# Patient Record
Sex: FEMALE
[De-identification: names, ages, dates, MRNs, and addresses within clinical notes are randomized per-mention and may not be internally consistent; named-entity substitution may affect disease eponyms.]

---

## 2024-03-21 PROBLEM — Z00.00 ENCOUNTER FOR PREVENTIVE HEALTH EXAMINATION: Status: ACTIVE | Noted: 2024-03-21

## 2024-03-22 ENCOUNTER — APPOINTMENT (OUTPATIENT)
Dept: RADIATION ONCOLOGY | Facility: CLINIC | Age: 54
End: 2024-03-22
Payer: COMMERCIAL

## 2024-03-22 VITALS
SYSTOLIC BLOOD PRESSURE: 144 MMHG | HEART RATE: 78 BPM | TEMPERATURE: 98.4 F | WEIGHT: 168.04 LBS | BODY MASS INDEX: 28 KG/M2 | HEIGHT: 65 IN | RESPIRATION RATE: 16 BRPM | DIASTOLIC BLOOD PRESSURE: 79 MMHG | OXYGEN SATURATION: 100 %

## 2024-03-22 DIAGNOSIS — N18.6 END STAGE RENAL DISEASE: ICD-10-CM

## 2024-03-22 DIAGNOSIS — C21.1 MALIGNANT NEOPLASM OF ANAL CANAL: ICD-10-CM

## 2024-03-22 PROCEDURE — 99213 OFFICE O/P EST LOW 20 MIN: CPT

## 2024-03-22 RX ORDER — FAMOTIDINE 20 MG/1
20 TABLET, FILM COATED ORAL
Refills: 0 | Status: ACTIVE | COMMUNITY

## 2024-03-22 RX ORDER — PREDNISONE 5 MG/1
5 TABLET ORAL
Refills: 0 | Status: ACTIVE | COMMUNITY

## 2024-03-22 RX ORDER — CHOLECALCIFEROL (VITAMIN D3) 25 MCG
TABLET ORAL
Refills: 0 | Status: ACTIVE | COMMUNITY

## 2024-03-22 RX ORDER — TACROLIMUS 5 MG/1
5 CAPSULE, GELATIN COATED ORAL
Refills: 0 | Status: ACTIVE | COMMUNITY

## 2024-03-22 RX ORDER — GINGER ROOT/GINGER ROOT EXT 262.5 MG
CAPSULE ORAL
Refills: 0 | Status: ACTIVE | COMMUNITY

## 2024-03-22 RX ORDER — PRAVASTATIN SODIUM 20 MG/1
20 TABLET ORAL
Refills: 0 | Status: ACTIVE | COMMUNITY

## 2024-03-22 NOTE — REVIEW OF SYSTEMS
[Negative] : Neurological [Constipation: Grade 0] : Constipation: Grade 0 [Anal Pain: Grade 0] : Anal Pain: Grade 0 [Diarrhea: Grade 0] : Diarrhea: Grade 0 [Dysphagia: Grade 0] : Dysphagia: Grade 0 [Dyspepsia: Grade 0] : Dyspepsia: Grade 0 [Esophagitis: Grade 0] : Esophagitis: Grade 0 [Fecal Incontinence: Grade 0] : Fecal Incontinence: Grade 0 [Gastroparesis: Grade 0] : Gastroparesis: Grade 0 [Nausea: Grade 0] : Nausea: Grade 0 [Proctitis: Grade 0] : Proctitis: Grade 0 [Rectal Pain: Grade 0] : Rectal Pain: Grade 0 [Vomiting: Grade 0] : Vomiting: Grade 0 [Hematuria: Grade 0] : Hematuria: Grade 0 [Urinary Incontinence: Grade 0] : Urinary Incontinence: Grade 0  [Urinary Retention: Grade 0] : Urinary Retention: Grade 0 [Urinary Tract Pain: Grade 0] : Urinary Tract Pain: Grade 0 [Urinary Urgency: Grade 0] : Urinary Urgency: Grade 0 [Urinary Frequency: Grade 0] : Urinary Frequency: Grade 0 [Genital Edema: Grade 0] : Genital Edema: Grade 0 [Vaginal Stricture: Grade 0] : Vaginal Stricture: Grade 0 [Vaginal Infection: Grade 0] : Vaginal Infection: Grade 0  [Dyspareunia: Grade 0] : Dyspareunia: Grade 0 [Cognitive Disturbance: Grade 0] : Cognitive Disturbance: Grade 0 [Concentration Impairment: Grade 0] : Concentration Impairment: Grade 0 [Headache: Grade 0] : Headache: Grade 0 [Dizziness: Grade 0] : Dizziness: Grade 0  [Anxiety: Grade 0] : Anxiety: Grade 0  [Depression: Grade 0] : Depression: Grade 0

## 2024-03-22 NOTE — HISTORY OF PRESENT ILLNESS
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 Received radiochemotherapy from June 24,2019 to August 2, 2019. complete response,. yearly PET CT have revealed complete response and no recurrence. Last PET CT done April 2013. Next scheduled April 2024. Experienced rectal bleeding 2 months ago, heavy, saw Dr Sin at [facility], diagnosed as hemorrhoids, elastic band stopped bleeding. Proctoscopy revealed radiation proctitis, but patient is not bleeding after treating hemorrhoids.  Using mesalamine suppositories only during bleeding episode, patient required only once since then.  Followed by Dr Alvarez. Still has port right upper chest. using vaginal dilator as instructed.   Ms. Nusrat Pereyra is a 52 yo F pmhx kidney transplant in 2005 for end stage renal disease, hysterectomy dx T1N0M0 anal squamous cell carcinoma.    6/2023 Routine colonoscopy revealed condyloma, biopsy consistent with low grade squamous intraepithelial lesions. Three lesions positive for p63, p40 and p16.  Other 2 lesions revealed high-grade squamous intraepithelial lesions.    PET CT  October 2019. March 2020, June 2020. April 2022 and April 2023 were negative for metabolic activity.  ------------------------------------------------------------------------------------------------------------------------------------------------------- 3/22/24-Follow up - Today patient present generally well with no complaints of fever, chest pain, sob, nausea, vomiting, rectal bleeding, changes in bowel or urinary symptoms. Reports no changes in appetite or sudden weight loss.  Continues to use vaginal dilator.

## 2024-03-22 NOTE — LETTER GREETING
[Follow-Up] : Your patient, [unfilled] was seen in my office today for follow-up [FreeTextEntry2] : Myla Alvarez M.D

## 2024-03-22 NOTE — PHYSICAL EXAM
[FreeTextEntry1] : Alert. oriented. No adenopathy. Lungs clear. Port right upper chest. Abdomen soft. RABIA: good sphincter tone. No palpable mass.

## 2024-09-05 ENCOUNTER — OFFICE VISIT (OUTPATIENT)
Dept: URBAN - METROPOLITAN AREA CLINIC 128 | Facility: CLINIC | Age: 54
End: 2024-09-05
Payer: COMMERCIAL

## 2024-09-05 VITALS
BODY MASS INDEX: 24.1 KG/M2 | DIASTOLIC BLOOD PRESSURE: 79 MMHG | HEIGHT: 63 IN | HEART RATE: 98 BPM | TEMPERATURE: 98.6 F | WEIGHT: 136 LBS | SYSTOLIC BLOOD PRESSURE: 114 MMHG

## 2024-09-05 DIAGNOSIS — R13.19 DYSPHAGIA: ICD-10-CM

## 2024-09-05 PROCEDURE — 99214 OFFICE O/P EST MOD 30 MIN: CPT | Performed by: INTERNAL MEDICINE

## 2024-09-05 NOTE — HPI-TODAY'S VISIT:
history of dysphagia since last year. ugi showed cerivical osteophytes with additional narrowing at distal esophagus and barium tablet hanging in distal esophagus. reports intermittent dysphagia/spasm with eating unrelated to size of food bolus. no bleeding. on ozempic.

## 2024-09-23 ENCOUNTER — OFFICE VISIT (OUTPATIENT)
Dept: URBAN - METROPOLITAN AREA SURGERY CENTER 31 | Facility: SURGERY CENTER | Age: 54
End: 2024-09-23

## 2024-09-24 ENCOUNTER — OFFICE VISIT (OUTPATIENT)
Dept: URBAN - METROPOLITAN AREA SURGERY CENTER 31 | Facility: SURGERY CENTER | Age: 54
End: 2024-09-24

## 2024-09-24 ENCOUNTER — CLAIMS CREATED FROM THE CLAIM WINDOW (OUTPATIENT)
Dept: URBAN - METROPOLITAN AREA SURGERY CENTER 31 | Facility: SURGERY CENTER | Age: 54
End: 2024-09-24
Payer: COMMERCIAL

## 2024-09-24 DIAGNOSIS — K29.70 GASTRITIS: ICD-10-CM

## 2024-09-24 DIAGNOSIS — K22.2 ESOPHAGEAL STENOSIS: ICD-10-CM

## 2024-09-24 PROCEDURE — 00731 ANES UPR GI NDSC PX NOS: CPT | Performed by: NURSE ANESTHETIST, CERTIFIED REGISTERED

## 2024-10-04 ENCOUNTER — TELEPHONE ENCOUNTER (OUTPATIENT)
Dept: URBAN - METROPOLITAN AREA CLINIC 128 | Facility: CLINIC | Age: 54
End: 2024-10-04

## 2024-10-04 ENCOUNTER — APPOINTMENT (OUTPATIENT)
Dept: RADIATION ONCOLOGY | Facility: CLINIC | Age: 54
End: 2024-10-04
Payer: COMMERCIAL

## 2024-10-04 PROCEDURE — 99214 OFFICE O/P EST MOD 30 MIN: CPT

## 2024-10-04 NOTE — HISTORY OF PRESENT ILLNESS
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 Received radiochemotherapy from June 24,2019 to August 2, 2019 achieved complete response. Yearly PET CT have revealed complete response and no recurrence. Last PET CT done April 2013. Next scheduled April 2024. Experienced rectal bleeding 2 months ago, heavy, saw Dr Sin at [facility], diagnosed as hemorrhoids, elastic band stopped bleeding. Proctoscopy revealed radiation proctitis, but patient is not bleeding after treating hemorrhoids.  Using mesalamine suppositories only during bleeding episode, patient required only once since then.  Followed by Dr Alvarez. Port right upper chest removed last week.  Using vaginal dilator as instructed.   Ms. Nusrat Pereyra is a 54 yo female , history of kidney transplant in 2005 for end stage renal disease. Hysterectomy dx T1N0M0 anal squamous cell carcinoma.    June/2023- Routine colonoscopy revealed condyloma, biopsy consistent with low grade squamous intraepithelial lesions. Three lesions positive for p63, p40 and p16.  Other 2 lesions revealed high-grade squamous intraepithelial lesions.    PET CT October 2019. March 2020, June 2020. April 2022 and April 2023, April 2024 were negative for metabolic activity.  ------------------------------------------------------------------------------------------------------------------------------------------------------- 10/4/24 Follow up Patient feels well today. No significant change in weight or appetite. Occasional constipation resolved with hydration and increased intake of fiber. Scant bleeding from hemorrhoids last week resolved with suppository medication. Avoids foods which trigger GI upset. She is using the vaginal dilator. Last seen by med-onc Dr. Alvarez July 2024, next appointment October 11, 2024. Next appointment with nephrologist 10/16/2024. Last EGD June 2023. PET done in April 2024 at [facility] ([facility]).  Latest imaging: PET CT Tumor Imaging Skull-Thigh 4/4/2024 IMPRESSION: 1. No abnormal hypermetabolic activity to suggest malignancy at this time. 2. Right lower quadrant renal transplant.  3/22/24-Follow up - Today patient present generally well with no complaints of fever, chest pain, sob, nausea, vomiting, rectal bleeding, changes in bowel or urinary symptoms. Reports no changes in appetite or sudden weight loss.  Continues to use vaginal dilator.

## 2024-10-04 NOTE — REVIEW OF SYSTEMS
[Anal Pain: Grade 0] : Anal Pain: Grade 0 [Constipation: Grade 0] : Constipation: Grade 0 [Diarrhea: Grade 0] : Diarrhea: Grade 0 [Dyspepsia: Grade 0] : Dyspepsia: Grade 0 [Dysphagia: Grade 0] : Dysphagia: Grade 0 [Esophagitis: Grade 0] : Esophagitis: Grade 0 [Fecal Incontinence: Grade 0] : Fecal Incontinence: Grade 0 [Gastroparesis: Grade 0] : Gastroparesis: Grade 0 [Nausea: Grade 0] : Nausea: Grade 0 [Proctitis: Grade 0] : Proctitis: Grade 0 [Rectal Pain: Grade 0] : Rectal Pain: Grade 0 [Vomiting: Grade 0] : Vomiting: Grade 0 [Hematuria: Grade 0] : Hematuria: Grade 0 [Urinary Incontinence: Grade 0] : Urinary Incontinence: Grade 0  [Urinary Retention: Grade 0] : Urinary Retention: Grade 0 [Urinary Tract Pain: Grade 0] : Urinary Tract Pain: Grade 0 [Urinary Urgency: Grade 0] : Urinary Urgency: Grade 0 [Urinary Frequency: Grade 0] : Urinary Frequency: Grade 0 [Genital Edema: Grade 0] : Genital Edema: Grade 0 [Vaginal Stricture: Grade 0] : Vaginal Stricture: Grade 0 [Vaginal Infection: Grade 0] : Vaginal Infection: Grade 0  [Dyspareunia: Grade 0] : Dyspareunia: Grade 0 [Cognitive Disturbance: Grade 0] : Cognitive Disturbance: Grade 0 [Concentration Impairment: Grade 0] : Concentration Impairment: Grade 0 [Dizziness: Grade 0] : Dizziness: Grade 0  [Headache: Grade 0] : Headache: Grade 0 [Anxiety: Grade 0] : Anxiety: Grade 0  [Depression: Grade 0] : Depression: Grade 0 [Negative] : Gastrointestinal

## 2024-10-04 NOTE — LETTER GREETING
[Follow-Up] : Your patient, [unfilled] was seen in my office today for follow-up [Dear  ___] : Dear  [unfilled], [FreeTextEntry2] : Myla Dodd M.D

## 2024-10-04 NOTE — PHYSICAL EXAM
[FreeTextEntry1] : Alert. oriented. No adenopathy. Lungs clear. Port right upper chest removed. Abdomen soft. RABIA: good sphincter tone. No palpable mass.

## 2024-10-04 NOTE — HISTORY OF PRESENT ILLNESS
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 Received radiochemotherapy from June 24,2019 to August 2, 2019 achieved complete response. Yearly PET CT have revealed complete response and no recurrence. Last PET CT done April 2013. Next scheduled April 2024. Experienced rectal bleeding 2 months ago, heavy, saw Dr Sin at [facility], diagnosed as hemorrhoids, elastic band stopped bleeding. Proctoscopy revealed radiation proctitis, but patient is not bleeding after treating hemorrhoids.  Using mesalamine suppositories only during bleeding episode, patient required only once since then.  Followed by Dr Alvarez. Port right upper chest removed last week.  Using vaginal dilator as instructed.   Ms. Nusrat Pereyra is a 54 yo female , history of kidney transplant in 2005 for end stage renal disease. Hysterectomy dx T1N0M0 anal squamous cell carcinoma.    June/2023- Routine colonoscopy revealed condyloma, biopsy consistent with low grade squamous intraepithelial lesions. Three lesions positive for p63, p40 and p16.  Other 2 lesions revealed high-grade squamous intraepithelial lesions.    PET CT October 2019. March 2020, June 2020. April 2022 and April 2023, April 2024 were negative for metabolic activity.  ------------------------------------------------------------------------------------------------------------------------------------------------------- 10/4/24 Follow up Patient feels well today. No significant change in weight or appetite. Occasional constipation resolved with hydration and increased intake of fiber. Scant bleeding from hemorrhoids last week resolved with suppository medication. Avoids foods which trigger GI upset. She is using the vaginal dilator. Last seen by med-onc Dr. Alvarez July 2024, next appointment October 11, 2024. Next appointment with nephrologist 10/16/2024. Last EGD June 2023. PET done in April 2024 at [facility] ([facility]).  Latest imaging: PET CT Tumor Imaging Skull-Thigh 4/4/2024 IMPRESSION: 1. No abnormal hypermetabolic activity to suggest malignancy at this time. 2. Right lower quadrant renal transplant.  3/22/24-Follow up - Today patient present generally well with no complaints of fever, chest pain, sob, nausea, vomiting, rectal bleeding, changes in bowel or urinary symptoms. Reports no changes in appetite or sudden weight loss.  Continues to use vaginal dilator.

## 2024-10-04 NOTE — VITALS
[Maximal Pain Intensity: 0/10] : 0/10 [Least Pain Intensity: 0/10] : 0/10 [90: Able to carry normal activity; minor signs or symptoms of disease.] : 90: Able to carry normal activity; minor signs or symptoms of disease.  [90: Minor restrictions in physically strenous activity.] : 90: Minor restrictions in physically strenuous activity. [ECOG Performance Status: 1 - Restricted in physically strenuous activity but ambulatory and able to carry out work of a light or sedentary nature] : Performance Status: 1 - Restricted in physically strenuous activity but ambulatory and able to carry out work of a light or sedentary nature, e.g., light house work, office work [ECOG Performance Status: 0 - Fully active, able to carry on all pre-disease performance without restriction] : Performance Status: 0 - Fully active, able to carry on all pre-disease performance without restriction

## 2024-10-04 NOTE — HISTORY OF PRESENT ILLNESS
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 Received radiochemotherapy from June 24,2019 to August 2, 2019 achieved complete response. Yearly PET CT have revealed complete response and no recurrence. Last PET CT done April 2013. Next scheduled April 2024. Experienced rectal bleeding 2 months ago, heavy, saw Dr Sin at [facility], diagnosed as hemorrhoids, elastic band stopped bleeding. Proctoscopy revealed radiation proctitis, but patient is not bleeding after treating hemorrhoids.  Using mesalamine suppositories only during bleeding episode, patient required only once since then.  Followed by Dr Alvarez. Port right upper chest removed last week.  Using vaginal dilator as instructed.   Ms. Nusrat Pereyra is a 52 yo female , history of kidney transplant in 2005 for end stage renal disease. Hysterectomy dx T1N0M0 anal squamous cell carcinoma.    June/2023- Routine colonoscopy revealed condyloma, biopsy consistent with low grade squamous intraepithelial lesions. Three lesions positive for p63, p40 and p16.  Other 2 lesions revealed high-grade squamous intraepithelial lesions.    PET CT October 2019. March 2020, June 2020. April 2022 and April 2023, April 2024 were negative for metabolic activity.  ------------------------------------------------------------------------------------------------------------------------------------------------------- 10/4/24 Follow up Patient feels well today. No significant change in weight or appetite. Occasional constipation resolved with hydration and increased intake of fiber. Scant bleeding from hemorrhoids last week resolved with suppository medication. Avoids foods which trigger GI upset. She is using the vaginal dilator. Last seen by med-onc Dr. Alvarez July 2024, next appointment October 11, 2024. Next appointment with nephrologist 10/16/2024. Last EGD June 2023. PET done in April 2024 at [facility] ([facility]).  Latest imaging: PET CT Tumor Imaging Skull-Thigh 4/4/2024 IMPRESSION: 1. No abnormal hypermetabolic activity to suggest malignancy at this time. 2. Right lower quadrant renal transplant.  3/22/24-Follow up - Today patient present generally well with no complaints of fever, chest pain, sob, nausea, vomiting, rectal bleeding, changes in bowel or urinary symptoms. Reports no changes in appetite or sudden weight loss.  Continues to use vaginal dilator.

## 2024-10-24 ENCOUNTER — WEB ENCOUNTER (OUTPATIENT)
Dept: URBAN - METROPOLITAN AREA CLINIC 128 | Facility: CLINIC | Age: 54
End: 2024-10-24

## 2025-01-03 ENCOUNTER — TELEPHONE ENCOUNTER (OUTPATIENT)
Dept: URBAN - METROPOLITAN AREA CLINIC 128 | Facility: CLINIC | Age: 55
End: 2025-01-03

## 2025-01-03 RX ORDER — PANTOPRAZOLE SODIUM 40 MG/1
1 TABLET TABLET, DELAYED RELEASE ORAL ONCE A DAY
Qty: 90 TABLET | Refills: 3 | OUTPATIENT
Start: 2025-01-06

## 2025-04-17 ENCOUNTER — NON-APPOINTMENT (OUTPATIENT)
Age: 55
End: 2025-04-17

## 2025-05-02 ENCOUNTER — APPOINTMENT (OUTPATIENT)
Dept: RADIATION ONCOLOGY | Facility: CLINIC | Age: 55
End: 2025-05-02
Payer: COMMERCIAL

## 2025-05-02 VITALS
SYSTOLIC BLOOD PRESSURE: 161 MMHG | RESPIRATION RATE: 16 BRPM | OXYGEN SATURATION: 100 % | BODY MASS INDEX: 27.99 KG/M2 | TEMPERATURE: 97.2 F | DIASTOLIC BLOOD PRESSURE: 83 MMHG | HEART RATE: 70 BPM | HEIGHT: 65 IN | WEIGHT: 168 LBS

## 2025-05-02 PROCEDURE — 99214 OFFICE O/P EST MOD 30 MIN: CPT
